# Patient Record
Sex: FEMALE | Race: BLACK OR AFRICAN AMERICAN | NOT HISPANIC OR LATINO | Employment: OTHER | URBAN - METROPOLITAN AREA
[De-identification: names, ages, dates, MRNs, and addresses within clinical notes are randomized per-mention and may not be internally consistent; named-entity substitution may affect disease eponyms.]

---

## 2017-02-17 ENCOUNTER — TELEPHONE (OUTPATIENT)
Dept: GASTROENTEROLOGY | Facility: CLINIC | Age: 67
End: 2017-02-17

## 2017-02-17 NOTE — TELEPHONE ENCOUNTER
----- Message from Milla Mehta sent at 2017  8:59 AM CST -----  Contact: [place] WOOD  with [facility]  Billing office for Dr. Cecilia Carranza Requesting call back [phone] ext [phone]. Have 2 dates of service 16 and they need updates authorization for services. The authorization  on 16. Thank you!

## 2017-02-17 NOTE — TELEPHONE ENCOUNTER
S/w [place] she is looking for authorization numbers for dates of service. I explained to her that I am just the nurse that I have no idea about auth numbers. I redirected her to call the main [facility] number and request to speak to the person who handles making all the [facility] appts. She verbalized understanding.